# Patient Record
Sex: MALE | Race: WHITE
[De-identification: names, ages, dates, MRNs, and addresses within clinical notes are randomized per-mention and may not be internally consistent; named-entity substitution may affect disease eponyms.]

---

## 2019-10-21 ENCOUNTER — HOSPITAL ENCOUNTER (EMERGENCY)
Dept: HOSPITAL 97 - ER | Age: 69
Discharge: HOME | End: 2019-10-21
Payer: COMMERCIAL

## 2019-10-21 VITALS — DIASTOLIC BLOOD PRESSURE: 72 MMHG | SYSTOLIC BLOOD PRESSURE: 136 MMHG | OXYGEN SATURATION: 95 %

## 2019-10-21 VITALS — TEMPERATURE: 97.6 F

## 2019-10-21 DIAGNOSIS — I25.2: ICD-10-CM

## 2019-10-21 DIAGNOSIS — Z88.0: ICD-10-CM

## 2019-10-21 DIAGNOSIS — Z79.82: ICD-10-CM

## 2019-10-21 DIAGNOSIS — R55: Primary | ICD-10-CM

## 2019-10-21 DIAGNOSIS — E11.9: ICD-10-CM

## 2019-10-21 DIAGNOSIS — I10: ICD-10-CM

## 2019-10-21 DIAGNOSIS — E78.5: ICD-10-CM

## 2019-10-21 DIAGNOSIS — Z95.818: ICD-10-CM

## 2019-10-21 DIAGNOSIS — Z95.1: ICD-10-CM

## 2019-10-21 LAB
ALBUMIN SERPL BCP-MCNC: 4.1 G/DL (ref 3.4–5)
ALP SERPL-CCNC: 104 U/L (ref 45–117)
ALT SERPL W P-5'-P-CCNC: 37 U/L (ref 12–78)
AST SERPL W P-5'-P-CCNC: 26 U/L (ref 15–37)
BUN BLD-MCNC: 12 MG/DL (ref 7–18)
CKMB CREATINE KINASE MB: 1 NG/ML (ref 0.3–3.6)
GLUCOSE SERPLBLD-MCNC: 134 MG/DL (ref 74–106)
HCT VFR BLD CALC: 39.9 % (ref 39.6–49)
LIPASE SERPL-CCNC: 232 U/L (ref 73–393)
LYMPHOCYTES # SPEC AUTO: 1.7 K/UL (ref 0.7–4.9)
MAGNESIUM SERPL-MCNC: 1.6 MG/DL (ref 1.8–2.4)
PMV BLD: 8.2 FL (ref 7.6–11.3)
POTASSIUM SERPL-SCNC: 4.3 MMOL/L (ref 3.5–5.1)
RBC # BLD: 4.68 M/UL (ref 4.33–5.43)
TROPONIN (EMERG DEPT USE ONLY): < 0.02 NG/ML (ref 0–0.04)

## 2019-10-21 PROCEDURE — 71275 CT ANGIOGRAPHY CHEST: CPT

## 2019-10-21 PROCEDURE — 85025 COMPLETE CBC W/AUTO DIFF WBC: CPT

## 2019-10-21 PROCEDURE — 36415 COLL VENOUS BLD VENIPUNCTURE: CPT

## 2019-10-21 PROCEDURE — 93005 ELECTROCARDIOGRAM TRACING: CPT

## 2019-10-21 PROCEDURE — 84484 ASSAY OF TROPONIN QUANT: CPT

## 2019-10-21 PROCEDURE — 83735 ASSAY OF MAGNESIUM: CPT

## 2019-10-21 PROCEDURE — 96365 THER/PROPH/DIAG IV INF INIT: CPT

## 2019-10-21 PROCEDURE — 83690 ASSAY OF LIPASE: CPT

## 2019-10-21 PROCEDURE — 70450 CT HEAD/BRAIN W/O DYE: CPT

## 2019-10-21 PROCEDURE — 80076 HEPATIC FUNCTION PANEL: CPT

## 2019-10-21 PROCEDURE — 99285 EMERGENCY DEPT VISIT HI MDM: CPT

## 2019-10-21 PROCEDURE — 80048 BASIC METABOLIC PNL TOTAL CA: CPT

## 2019-10-21 PROCEDURE — 82550 ASSAY OF CK (CPK): CPT

## 2019-10-21 PROCEDURE — 93880 EXTRACRANIAL BILAT STUDY: CPT

## 2019-10-21 PROCEDURE — 82553 CREATINE MB FRACTION: CPT

## 2019-10-21 NOTE — EDPHYS
Physician Documentation                                                                           

 [facility]                                                                 

Name: Miquel Haines                                                                               

Age: 69 yrs                                                                                       

Sex: Male                                                                                         

: 1950                                                                                   

MRN: [id number]                                                                                   

Arrival Date: 10/21/2019                                                                          

Time: 07:33                                                                                       

Account#: [id number]                                                                            

Bed 13                                                                                            

Private MD:                                                                                       

ED Physician Heber Westbrook                                                                         

HPI:                                                                                              

10/21                                                                                             

07:35 This 69 yrs old  Male presents to ER via [facility] with complaints of syncope.rn  

07:35 The patient has experienced syncope, lost consciousness. Onset: The symptoms/episode    rn  

      began/occurred just prior to arrival. Duration: This was a single episode, that lasted      

      an unknown period of time. Context: occurred at work, occurred while the patient was        

      standing. Associated signs and symptoms: Pertinent negatives: abdominal pain, ataxia,       

      blurred vision, chest pain, combativeness, confusion, headache, seizure, shortness of       

      breath, vertigo, vomiting, weakness. Current symptoms: Currently, the patient is not        

      experiencing any symptoms. The patient has not experienced similar symptoms in the          

      past. The patient has not recently seen a physician. Reports at work, standing at           

      [employer] for meeting, had a strange smell and nausea, then passed out, single episode, no     

      assoc chest pain. Reports woke up diaphoretic and a little sob, but now resolved and        

      back to baseline. Was not standing for prolonged period of time. REports last MI 6          

      years ago. No hx of stroke. No focal neurological complaints or problems. NOrmal            

      glucose per EMS. .                                                                          

                                                                                                  

Historical:                                                                                       

- Allergies:                                                                                      

07:39 PENICILLINS;                                                                            iw  

- Home Meds:                                                                                      

07:39 aspirin 81 mg Oral chew 1 tab once daily [Active]; Bystolic 20 mg oral tab 1 tab once   iw  

      daily [Active]; metformin 750 mg Oral Tb24 1 tab once daily [Active]; eplerenone 50 mg      

      oral tab 1 tab once daily [Active]; rosuvastatin 20 mg oral tab 1 tab once daily            

      [Active]; amlodipine 10 mg oral tab once daily [Active];                                    

- PMHx:                                                                                           

07:39 Myocardial infarction; Hyperlipidemia; Hypertension; Diabetes - NIDDM;                  iw  

- PSHx:                                                                                           

07:39 CABG; Heart stents; back;                                                               iw  

                                                                                                  

- Immunization history:: Adult Immunizations.                                                     

- Social history:: Smoking status: .                                                              

- Ebola Screening: : Patient denies travel to an Ebola-affected area in the 21 days               

  before illness onset Patient negative for fever greater than or equal to 101.5                  

  degrees Fahrenheit, and additional compatible Ebola Virus Disease symptoms Patient              

  denies exposure to infectious person Patient denies travel to an Ebola-affected area            

  in the 21 days before illness onset No symptoms or risks identified at this time.               

- Family history:: not pertinent.                                                                 

- Hospitalizations: : No recent hospitalization is reported.                                      

                                                                                                  

                                                                                                  

ROS:                                                                                              

07:35 Constitutional: Negative for fever, chills, and weight loss, Eyes: Negative for injury, rn  

      pain, redness, and discharge, ENT: Negative for injury, pain, and discharge, Neck:          

      Negative for injury, pain, and swelling, Cardiovascular: Negative for chest pain,           

      palpitations, and edema, Respiratory: Negative for shortness of breath, cough,              

      wheezing, and pleuritic chest pain, Abdomen/GI: Negative for abdominal pain, vomiting,      

      diarrhea, and constipation, Back: Negative for injury and pain, MS/Extremity: Negative      

      for injury and deformity, Skin: Negative for injury, rash, and discoloration, Neuro:        

      Negative for headache, weakness, numbness, tingling, and seizure.                           

                                                                                                  

Exam:                                                                                             

07:35 Constitutional:  This is a well developed, well nourished patient who is awake, alert,  rn  

      and in no acute distress. Head/Face:  Normocephalic, atraumatic. Eyes:  Pupils equal        

      round and reactive to light, extra-ocular motions intact.  Lids and lashes normal.          

      Conjunctiva and sclera are non-icteric and not injected.  Cornea within normal limits.      

      Periorbital areas with no swelling, redness, or edema. ENT:  MMM Neck:  Trachea             

      midline, no thyromegaly or masses palpated, and no cervical lymphadenopathy.  Supple,       

      full range of motion without nuchal rigidity, or vertebral point tenderness.  No            

      Meningismus. Cardiovascular:  Regular rate and rhythm.  No gallops, murmurs, or rubs.       

      No JVD.  No pulse deficits. Respiratory:  Lungs have equal breath sounds bilaterally,       

      clear to auscultation.  No increased work of breathing, no retractions or nasal             

      flaring. Abdomen/GI:  soft, non-tender Skin:  Warm, dry  MS/ Extremity:  Pulses equal,      

      no cyanosis.  Neurovascular intact.  Full, normal range of motion.  Equal                   

      circumference. Neuro:  Awake and alert, GCS 15, oriented to person, place, time, and        

      situation.  Cranial nerves II-XII grossly intact.  Motor strength 5/5 in all                

      extremities.  Sensory grossly intact.  Cerebellar exam normal.                              

08:22 ECG was reviewed by the Attending Physician.                                            rn  

                                                                                                  

Vital Signs:                                                                                      

07:34  / 82; Pulse 73; Resp 19; Temp 97.6; Pulse Ox 98% on R/A; Weight 81.65 kg; Height iw  

      6 ft. 1 in. (185.42 cm); Pain 0/10;                                                         

08:58  / 85; Pulse 67; Resp 15; Pulse Ox 98% on R/A;                                    tw2 

09:38  / 97; Pulse 64; Resp 17; Pulse Ox 97% on R/A;                                    tw2 

10:32  / 72; Pulse 60; Resp 20; Pulse Ox 95% on R/A;                                    tw2 

07:34 Body Mass Index 23.75 (81.65 kg, 185.42 cm)                                             iw  

                                                                                                  

MDM:                                                                                              

07:34 Patient medically screened.                                                             rn  

09:32 ED course: CT PE negative for PE/pericardial effusion. Shows lymphadenopathy,           rn  

      nonspecific. Normal WBC, hasn't smoked for 50 years, no night sweats or weight loss.        

      Patient still asymptomatic, offered him observation in hospital for syncope and a few       

      more tests, patient states feels fine and wants to go home. Told him I would like to        

      atleast get carotid u/s to rule out carotid flow problem given heart disease, patient       

      agrees. Also notified of lymphadenopathy and will need further w/u as outpt. .              

10:48 Differential Diagnosis: cardiac arrhythmia, cerebrovascular accident, idiopathic        rn  

      syncope, transient ischemic attack, vasovagal episode, dehydration, cardiac event. Data     

      reviewed: vital signs, nurses notes, lab test result(s), EKG, radiologic studies, CT        

      scan, doppler, and as a result, I will discharge patient. Counseling: I had a detailed      

      discussion with the patient and/or guardian regarding: the historical points, exam          

      findings, and any diagnostic results supporting the discharge/admit diagnosis, lab          

      results, radiology results, the need for outpatient follow up, to return to the             

      emergency department if symptoms worsen or persist or if there are any questions or         

      concerns that arise at home. Response to treatment: the patient's symptoms have             

      resolved after treatment, the patient's condition has returned to base line, the            

      patient is now symptom free, patient is well hydrated. and as a result, I will              

      discharge patient. ED course: Repeat troponin negative. Bilateral carotid doppler with      

      some mild disease. Patient still asymptomatic and wishes to go home. Will f/u as            

      recommended with Dr. Buckley for nonspecific mediastinal LAD. .                              

                                                                                                  

10/21                                                                                             

07:35 Order name: Lipase; Complete Time: 08:14                                                rn  

10/21                                                                                             

07:35 Order name: Basic Metabolic Panel; Complete Time: 08:14                                 rn  

10/21                                                                                             

07:35 Order name: CBC with Diff                                                               rn  

10/21                                                                                             

07:35 Order name: Ckmb; Complete Time: 08:14                                                  rn  

10/21                                                                                             

07:35 Order name: CPK; Complete Time: 08:14                                                   rn  

10/21                                                                                             

07:35 Order name: Hepatic Function; Complete Time: 08:14                                      rn  

10/21                                                                                             

07:35 Order name: CT Head Brain wo Cont; Complete Time: 08:14                                 rn  

10/21                                                                                             

07:35 Order name: Magnesium; Complete Time: 08:14                                             rn  

10/21                                                                                             

07:35 Order name: Troponin (emerg Dept Use Only); Complete Time: 08:14                        rn  

10/21                                                                                             

08:16 Order name: CT Chest For PE Angio; Complete Time: 09:19                                 rn  

10/21                                                                                             

09:31 Order name: Carotid Artery Bilateral US                                                 rn  

10/21                                                                                             

09:42 Order name: Troponin (emerg Dept Use Only); Complete Time: 10:48                        tw2 

10/21                                                                                             

07:35 Order name: EKG; Complete Time: 07:37                                                   rn  

10/21                                                                                             

07:35 Order name: Cardiac monitoring; Complete Time: 08:05                                    rn  

10/21                                                                                             

07:35 Order name: EKG - Nurse/Tech; Complete Time: 08:05                                      rn  

10/21                                                                                             

07:35 Order name: IV Saline Lock; Complete Time: 08:06                                        rn  

10/21                                                                                             

07:35 Order name: Labs collected and sent; Complete Time: 08:06                               rn  

10/21                                                                                             

07:35 Order name: NPO; Complete Time: 08:06                                                   rn  

10/21                                                                                             

07:35 Order name: O2 Per Protocol; Complete Time: 08:06                                       rn  

10/21                                                                                             

07:35 Order name: O2 Sat Monitoring; Complete Time: 08:06                                     rn  

                                                                                                  

EC:22 Rate is 69 beats/min. Rhythm is regular. QRS Axis is Normal. RI interval is normal. QRS rn  

      interval is normal. QT interval is normal. No Q waves. T waves are Inverted in leads I,     

      aVL. No ST changes noted. Clinical impression: NSR w/ Non-specific ST/T Changes.            

      Interpreted by me. Reviewed by me.                                                          

                                                                                                  

Administered Medications:                                                                         

08:50 Drug: Magnesium Sulfate 1 grams Route: IVPB; Infused Over: 1 hrs; Site: right hand;     tw2 

09:48 Follow up: Response: No adverse reaction; IV Status: Completed infusion                 tw2 

                                                                                                  

                                                                                                  

Disposition:                                                                                      

10/21/19 10:51 Discharged to Home. Impression: Syncope and collapse.                              

- Condition is Stable.                                                                            

- Discharge Instructions: Syncope, Lymphadenopathy.                                               

                                                                                                  

- Medication Reconciliation Form, Thank You Letter, Antibiotic Education, Prescription            

  Opioid Use, Work release form form.                                                             

- Follow up: Private Physician; When: As needed; Reason: Recheck today's complaints,              

  Re-evaluation by your physician.                                                                

- Problem is new.                                                                                 

- Symptoms have improved.                                                                         

                                                                                                  

                                                                                                  

                                                                                                  

Signatures:                                                                                       

Dispatcher [employer]                           EDMS                                                 

Rachael Henson RN RN iw Nieto, Roman, MD MD rn Wise, Tara, RN RN   tw2                                                  

                                                                                                  

Corrections: (The following items were deleted from the chart)                                    

08:22 08:15 Neck Angio+CT.RAD.BRZ ordered. EDMS                                               EDMS

08:22 08:15 Head Angio+CT.RAD.BRZ ordered. [facility]                                               EDMS

11:01 10:51 10/21/2019 10:51 Discharged to Home. Impression: Syncope and collapse. Condition  tw2 

      is Stable. Forms are Work release form, Medication Reconciliation Form, Thank You           

      Letter, Antibiotic Education, Prescription Opioid Use. Follow up: Private Physician;        

      When: As needed; Reason: Recheck today's complaints, Re-evaluation by your physician.       

      Problem is new. Symptoms have improved. rn                                                  

                                                                                                  

**************************************************************************************************

## 2019-10-21 NOTE — RAD REPORT
EXAM DESCRIPTION:   - CP - 10/21/2019 10:30 am

 

CLINICAL HISTORY:  Syncope

 

COMPARISON:  None.

 

TECHNIQUE:  Real-time sonographic evaluation of both carotid systems was performed. Gray scale and Do
ppler interrogation were performed with waveform tracing bilaterally.

 

FINDINGS:  Normal high resistance waveforms are noted in both external carotid arteries. The common c
arotid arteries and internal carotid arteries show normal low resistance waveforms.

 

Calcified plaquing changes are present in the right carotid bulb and extending into the proximal most
 aspect of the right internal carotid artery. Similar calcified plaquing changes are present in the l
eft carotid bulb and proximal most left ICA. Peak systolic and end diastolic velocity values and the 
ICA/CCA ratios are in the non-hemodynamically significant range.

 

Antegrade flow seen in both vertebral arteries.

 

Velocity values and ratios were recorded and are retained in the patient's imaging records.

 

 

IMPRESSION:  Bilateral carotid bulb and proximal internal carotid artery calcified plaquing changes. 
Visually the plaquing changes do not significantly narrow the vessel lumen.

 

No evidence of a hemodynamically significant stenosis.

## 2019-10-21 NOTE — EKG
Test Date:    2019-10-21               Test Time:    07:30:57

Technician:   VICK                                     

                                                     

MEASUREMENT RESULTS:                                       

Intervals:                                           

Rate:         69                                     

GA:           160                                    

QRSD:         84                                     

QT:           384                                    

QTc:          411                                    

Axis:                                                

P:            68                                     

GA:           160                                    

QRS:          34                                     

T:            107                                    

                                                     

INTERPRETIVE STATEMENTS:                                       

                                                     

Normal sinus rhythm

Nonspecific T wave abnormality

Abnormal ECG

No previous ECG available for comparison



Electronically Signed On 10-21-19 07:48:57 CDT by Porfirio Thomas

## 2019-10-21 NOTE — RAD REPORT
EXAM DESCRIPTION:  CT - Head Brain Wo Cont - 10/21/2019 7:55 am

 

CLINICAL HISTORY:  Syncope

 

COMPARISON:  None

 

TECHNIQUE:  Computed axial tomography of the head was obtained. IV contrast was not requested.

 

All CT scans are performed using dose optimization technique as appropriate and may include automated
 exposure control or mA/KV adjustment according to patient size.

 

FINDINGS:  An intracranial  bleed is not seen .

 

The ventricles are normal in caliber.

 

No extra-axial fluid collection is noted.

 

Mild-to-moderate chronic ethmoid sinusitis

 

IMPRESSION:  No acute intracranial abnormality is seen. If patient's symptoms persist  MRI of the bra
in would be recommended.

## 2019-10-21 NOTE — ER
Nurse's Notes                                                                                     

 [facility]                                                                 

Name: Miquel Haines                                                                               

Age: 69 yrs                                                                                       

Sex: Male                                                                                         

: 1950                                                                                   

MRN: [id number]                                                                                   

Arrival Date: 10/21/2019                                                                          

Time: 07:33                                                                                       

Account#: [id number]                                                                            

Bed 13                                                                                            

Private MD:                                                                                       

Diagnosis: Syncope and collapse                                                                   

                                                                                                  

Presentation:                                                                                     

10/21                                                                                             

07:35 Presenting complaint: Patient states: was in safety meeting, standing, got a weird      iw  

      smell in his nose then felt nauseous, then he was on the ground, pt woke up and felt        

      sweaty but felt like his normal self, denies headache, dizziness, or weakness, denies       

      chest pain, + hx of MI with stents placed X 6 years ago. Transition of care: patient        

      was not received from another setting of care. Onset of symptoms was 2019.      

      Risk Assessment: Do you want to hurt yourself or someone else? Patient reports no           

      desire to harm self or others. Initial Sepsis Screen: Does the patient meet any 2           

      criteria? No. Patient's initial sepsis screen is negative. Does the patient have a          

      suspected source of infection? No. Patient's initial sepsis screen is negative. Care        

      prior to arrival: Glucose check: 189.                                                       

07:35 Method Of Arrival: EMS: [facility]                                                         iw  

07:35 Acuity: BILL 2                                                                           iw  

                                                                                                  

Historical:                                                                                       

- Allergies:                                                                                      

07:39 PENICILLINS;                                                                            iw  

- Home Meds:                                                                                      

07:39 aspirin 81 mg Oral chew 1 tab once daily [Active]; Bystolic 20 mg oral tab 1 tab once   iw  

      daily [Active]; metformin 750 mg Oral Tb24 1 tab once daily [Active]; eplerenone 50 mg      

      oral tab 1 tab once daily [Active]; rosuvastatin 20 mg oral tab 1 tab once daily            

      [Active]; amlodipine 10 mg oral tab once daily [Active];                                    

- PMHx:                                                                                           

07:39 Myocardial infarction; Hyperlipidemia; Hypertension; Diabetes - NIDDM;                  iw  

- PSHx:                                                                                           

07:39 CABG; Heart stents; back;                                                               iw  

                                                                                                  

- Immunization history:: Adult Immunizations.                                                     

- Social history:: Smoking status: .                                                              

- Ebola Screening: : Patient denies travel to an Ebola-affected area in the 21 days               

  before illness onset Patient negative for fever greater than or equal to 101.5                  

  degrees Fahrenheit, and additional compatible Ebola Virus Disease symptoms Patient              

  denies exposure to infectious person Patient denies travel to an Ebola-affected area            

  in the 21 days before illness onset No symptoms or risks identified at this time.               

- Family history:: not pertinent.                                                                 

- Hospitalizations: : No recent hospitalization is reported.                                      

                                                                                                  

                                                                                                  

Screenin:35 Abuse screen: Denies threats or abuse. Nutritional screening: No deficits noted.        tw2 

      Tuberculosis screening: No symptoms or risk factors identified. Fall Risk None              

      identified.                                                                                 

                                                                                                  

Assessment:                                                                                       

07:40 General: Appears in no apparent distress. Behavior is calm, cooperative, appropriate    tw2 

      for age. Pain: Denies pain. Neuro: Level of Consciousness is awake, alert, obeys            

      commands, Oriented to person, place, time, situation. Cardiovascular: Denies chest          

      pain, shortness of breath, Heart tones S1 S2 Patient's skin is warm and dry.                

      Respiratory: Airway is patent Respiratory effort is even, unlabored, Respiratory            

      pattern is regular, symmetrical, Breath sounds are clear bilaterally. GI: No signs          

      and/or symptoms were reported involving the gastrointestinal system. Abdomen is flat,       

      Bowel sounds present X 4 quads. : No signs and/or symptoms were reported regarding        

      the genitourinary system. EENT: No signs and/or symptoms were reported regarding the        

      EENT system. Derm: No signs and/or symptoms reported regarding the dermatologic system.     

      Musculoskeletal: Range of motion: intact in all extremities.                                

08:58 Reassessment: Patient appears in no apparent distress at this time. No changes from     tw2 

      previously documented assessment. Patient and/or family updated on plan of care and         

      expected duration. Pain level reassessed. Patient is alert, oriented x 3, equal             

      unlabored respirations, skin warm/dry/pink.                                                 

09:57 Reassessment: US at bedside at this time.                                               tw2 

10:32 Reassessment: Patient appears in no apparent distress at this time. No changes from     tw2 

      previously documented assessment. Patient and/or family updated on plan of care and         

      expected duration. Pain level reassessed. Patient is alert, oriented x 3, equal             

      unlabored respirations, skin warm/dry/pink.                                                 

11:00 Reassessment: Patient appears in no apparent distress at this time. No changes from     tw2 

      previously documented assessment. Patient and/or family updated on plan of care and         

      expected duration. Pain level reassessed. Patient is alert, oriented x 3, equal             

      unlabored respirations, skin warm/dry/pink.                                                 

                                                                                                  

Vital Signs:                                                                                      

07:34  / 82; Pulse 73; Resp 19; Temp 97.6; Pulse Ox 98% on R/A; Weight 81.65 kg; Height iw  

      6 ft. 1 in. (185.42 cm); Pain 0/10;                                                         

08:58  / 85; Pulse 67; Resp 15; Pulse Ox 98% on R/A;                                    tw2 

09:38  / 97; Pulse 64; Resp 17; Pulse Ox 97% on R/A;                                    tw2 

10:32  / 72; Pulse 60; Resp 20; Pulse Ox 95% on R/A;                                    tw2 

07:34 Body Mass Index 23.75 (81.65 kg, 185.42 cm)                                             iw  

                                                                                                  

ED Course:                                                                                        

07:30 EKG done, by EKG tech. reviewed by Heber Westbrook MD.                                      at1 

07:33 Patient arrived in ED.                                                                  rn  

07:34 Heber Westbrook MD is Attending Physician.                                                rn  

07:34 Rhoda Jeffery RN is Primary Nurse.                                                        tw2 

07:35 Arm band placed on.                                                                     tw2 

07:35 Placed in gown. Bed in low position. Call light in reach. Cardiac monitor on. Pulse ox  tw2 

      on. NIBP on.                                                                                

07:43 Initial lab(s) drawn, by me, sent to lab. Inserted saline lock: 20 gauge in right       dh3 

      forearm, using aseptic technique.                                                           

07:45 Triage completed.                                                                       iw  

08:00 CT Head Brain wo Cont In Process Unspecified.                                           EDMS

08:36 CT Chest For PE Angio In Process Unspecified.                                           EDMS

09:48 Troponin (emerg Dept Use Only) Sent.                                                    tw2 

10:10 Carotid Artery Bilateral US In Process Unspecified.                                     EDMS

10:34 Ultrasound completed. Patient tolerated well.                                           lc3 

10:54 No provider procedures requiring assistance completed. IV discontinued, intact,         tw2 

      bleeding controlled, No redness/swelling at site. Pressure dressing applied.                

                                                                                                  

Administered Medications:                                                                         

08:50 Drug: Magnesium Sulfate 1 grams Route: IVPB; Infused Over: 1 hrs; Site: right hand;     tw2 

09:48 Follow up: Response: No adverse reaction; IV Status: Completed infusion                 tw2 

                                                                                                  

                                                                                                  

Outcome:                                                                                          

10:51 Discharge ordered by MD.                                                                rn  

11:01 Discharged to home ambulatory.                                                          tw2 

11:01 Condition: stable                                                                           

11:01 Discharge instructions given to patient, Instructed on discharge instructions, follow       

      up and referral plans. Demonstrated understanding of instructions, follow-up care.          

11:01 Patient left the ED.                                                                    tw2 

                                                                                                  

Signatures:                                                                                       

Dispatcher [employer]                           Rachael Soliman, RN                     RN   iw                                                   

Heber Westbrook MD MD   rn                                                   

Estela Carlson, EKG Tech              EKG Tat1                                                  

Nate Hsu Tara, RN                          RN   tw2                                                  

Dianna Boykin                              3                                                  

                                                                                                  

Corrections: (The following items were deleted from the chart)                                    

07:37 07:34  / 82; Pulse 73bpm; Resp 19bpm; Pulse Ox 98% RA; tw2                        iw  

                                                                                                  

**************************************************************************************************

## 2019-10-21 NOTE — RAD REPORT
EXAM DESCRIPTION:  CT - Chest For Pe Angio - 10/21/2019 8:35 am

 

CLINICAL HISTORY:   Shortness of breath

 

COMPARISON:  None.

 

TECHNIQUE:  Dynamically enhanced axial 3 mm thick images of the chest were obtained during administra
tion of <100> mL Isovue 370 IV contrast. Coronal and oblique reconstruction images were generated and
 reviewed. Exam utilizes a protocol for optimal evaluation of pulmonary arterial tree.

 

Maximum intensity projections 3D imaging was utilized

 

All CT scans are performed using dose optimization technique as appropriate and may include automated
 exposure control or mA/KV adjustment according to patient size.

 

FINDINGS:  A pulmonary embolus is not seen.

 

A thoracic aortic aneurysm is not noted.

 

A pleural effusion is not seen. A pericardial effusion is not seen.

 

A lung consolidation is not present.

 

Middle mediastinal and hilar lymph nodes are present varying in size from 1-2 centimeters.

 

IMPRESSION:  Negative for a pulmonary embolism.

 

Mild to moderate mediastinal and hilar lymphadenopathy. This could indicate lymphoma, metastatic dise
ase or be reactive in nature. Nuclear medicine PET-CT scan recommended for further evaluation